# Patient Record
(demographics unavailable — no encounter records)

---

## 2025-01-03 NOTE — HISTORY OF PRESENT ILLNESS
[FreeTextEntry1] : f/u acne [de-identified] : #acne f/u - still on OCPs, also on oneal 100 nightly and tolerating it well. very happy, minimal flares with periods, still with few acneiform papules on cheeks - topically, using tretinoin 0.1 nightly, tolerating well without dryness or irritation

## 2025-01-03 NOTE — ASSESSMENT
[FreeTextEntry1] : # Acne vulgaris, chronic flaring # Acne scarring  - Discussed nature and course of condition, including treatment options risks/benefits involved (topicals, spironolactone, isotretinoin) - Continue  tretinoin 0.1%, apply pea sized amount to entire face nightly as tolerated - INCREASE spironolactone TO 150mg qhs as tolerated(prior started 12/2020, decreased from 200mg 8/2022, was on 150 since 2/2023, increased to 150mg 1/2025) - START doxycycline 100mg BID x 1 mo. SED including but not limited to GI upset, esophagitis, photosensitivity, headache and pregnancy contraindication. - START clindamycin 1% lotion BID to flaring areas, SED - continue kurvelo as OCP, SED - r/b/a spironolactone discussed including breast tenderness, electrolyte abnormalities and spotting. - mighty patch prn  Patient may follow up in 3-6 months, or sooner PRN if any changes, new or growing lesions

## 2025-01-03 NOTE — HISTORY OF PRESENT ILLNESS
[FreeTextEntry1] : f/u acne [de-identified] : #acne f/u - still on OCPs, also on oneal 100 nightly and tolerating it well. very happy, minimal flares with periods, still with few acneiform papules on cheeks - topically, using tretinoin 0.1 nightly, tolerating well without dryness or irritation

## 2025-01-03 NOTE — PHYSICAL EXAM
[Alert] : alert [Oriented x 3] : ~L oriented x 3 [Well Nourished] : well nourished [Conjunctiva Non-injected] : conjunctiva non-injected [No Visual Lymphadenopathy] : no visual  lymphadenopathy [No Clubbing] : no clubbing [No Edema] : no edema [No Bromhidrosis] : no bromhidrosis [No Chromhidrosis] : no chromhidrosis [FreeTextEntry3] : some inflammatory papules on the b/l cheeks  few scattered hyper/hypopigmented scar on the face

## 2025-04-22 NOTE — HISTORY OF PRESENT ILLNESS
[FreeTextEntry1] : f/u acne [de-identified] : 19F Patient of Dr. Brodys, last seen Jan 2025, presenting today for follow-up  1. Acne vulgaris - still on OCPs, also on oneal 150 nightly and tolerating it well. No ADEs. Started 1 mo course of doxy and topical clinda lotion at - no difference with increased oneal, no difference with doxy.  - Not happy today. Flares with 1-3 pimples before and during menses. still with few acneiform papules on cheeks - topically, using tretinoin 0.1 nightly, tolerating well without dryness or irritation  - Concerned for scarring. Does not wear SPF.

## 2025-04-22 NOTE — ASSESSMENT
[FreeTextEntry1] : # Acne vulgaris, chronic, flaring, not at treatment goal # Acne scarring  - Discussed nature and course of condition, including treatment options risks/benefits involved (topicals, spironolactone, isotretinoin) - Increase tretinoin 0.1% to tazorac cream apply pea sized amount to entire face nightly as tolerated. SED.  - Improved but not at tx goal- will try for winlevi approval. If approved, start BID to acne prone areas. - C/w spironolactone 150mg qhs as tolerated (prior started 12/2020, decreased from 200mg 8/2022, was on 150 since 2/2023, increased to 150mg 1/2025) - C/w clindamycin 1% lotion BID to flaring areas, SED.  - Recommend discussing acne-approved medications with GYN. In the interim, can continue kurvelo as we have historically prescribed it here.  - r/b/a spironolactone discussed including breast tenderness, electrolyte abnormalities and spotting. - mighty patch prn  #post-inflammatory hyperpigmentation, chronic, flaring - Diagnosis reviewed. - Start tazorac as above.  - Photoprotection reviewed including sun-protective behaviors, protective clothing, and the use of OTC broad-spectrum SPF 30+ sunscreens was advised given involvement of photo-exposed areas.   Patient may follow up in 3-6 months, or sooner PRN if any changes- f/u with Dr. Masterson.

## 2025-04-22 NOTE — PHYSICAL EXAM
[Alert] : alert [Oriented x 3] : ~L oriented x 3 [Well Nourished] : well nourished [Conjunctiva Non-injected] : conjunctiva non-injected [No Visual Lymphadenopathy] : no visual  lymphadenopathy [No Clubbing] : no clubbing [No Edema] : no edema [No Bromhidrosis] : no bromhidrosis [No Chromhidrosis] : no chromhidrosis [FreeTextEntry3] : Focused skin exam performed  The relevant portions of the exam were performed today  AAOx3, NAD, well-appearing / pleasant Focused examination within normal limits with the exception of:  some inflammatory papules on the b/l cheeks  few scattered hyper/hypopigmented macules on the cheeks

## 2025-04-22 NOTE — HISTORY OF PRESENT ILLNESS
[FreeTextEntry1] : f/u acne [de-identified] : 19F Patient of Dr. Brodys, last seen Jan 2025, presenting today for follow-up  1. Acne vulgaris - still on OCPs, also on oneal 150 nightly and tolerating it well. No ADEs. Started 1 mo course of doxy and topical clinda lotion at - no difference with increased oneal, no difference with doxy.  - Not happy today. Flares with 1-3 pimples before and during menses. still with few acneiform papules on cheeks - topically, using tretinoin 0.1 nightly, tolerating well without dryness or irritation  - Concerned for scarring. Does not wear SPF.